# Patient Record
Sex: MALE | URBAN - METROPOLITAN AREA
[De-identification: names, ages, dates, MRNs, and addresses within clinical notes are randomized per-mention and may not be internally consistent; named-entity substitution may affect disease eponyms.]

---

## 2023-04-25 ENCOUNTER — OFFICE VISIT (OUTPATIENT)
Dept: URBAN - METROPOLITAN AREA CLINIC 51 | Facility: CLINIC | Age: 83
End: 2023-04-25
Payer: MEDICARE

## 2023-04-25 DIAGNOSIS — H04.123 DRY EYE SYNDROME OF BILATERAL LACRIMAL GLANDS: ICD-10-CM

## 2023-04-25 DIAGNOSIS — H40.013 OPEN ANGLE WITH BORDERLINE FINDINGS, LOW RISK, BILATERAL: ICD-10-CM

## 2023-04-25 DIAGNOSIS — H43.813 VITREOUS DEGENERATION, BILATERAL: ICD-10-CM

## 2023-04-25 DIAGNOSIS — H25.813 COMBINED FORMS OF AGE-RELATED CATARACT, BILATERAL: Primary | ICD-10-CM

## 2023-04-25 PROCEDURE — 92133 CPTRZD OPH DX IMG PST SGM ON: CPT | Performed by: OPTOMETRIST

## 2023-04-25 PROCEDURE — 99204 OFFICE O/P NEW MOD 45 MIN: CPT | Performed by: OPTOMETRIST

## 2023-04-25 PROCEDURE — 92134 CPTRZ OPH DX IMG PST SGM RTA: CPT | Performed by: OPTOMETRIST

## 2023-04-25 ASSESSMENT — INTRAOCULAR PRESSURE
OD: 13
OS: 15

## 2023-04-25 ASSESSMENT — VISUAL ACUITY
OS: 20/40
OD: 20/40

## 2023-04-25 ASSESSMENT — KERATOMETRY
OS: 42.25
OD: 42.25

## 2023-04-25 NOTE — IMPRESSION/PLAN
Impression: Open angle with borderline findings, low risk, bilateral: H40.013. Plan: c/d ratio IOP normal 
RNFL- wnl OU 
will monitor. 
** HVF prior to surgery **

## 2023-04-25 NOTE — IMPRESSION/PLAN
Impression: Dry eye syndrome of bilateral lacrimal glands: H04.123.  Plan: Recommend use Artificial tears QID OU.
ed the importance of treating prior to surgery

## 2023-04-25 NOTE — IMPRESSION/PLAN
Impression: Combined forms of age-related cataract, bilateral: H25.813.
** HVF prior to surgery **  Plan:  Discussed cataracts with patient. Discussed treatment options. Surgical treatment is recommended. Surgical risks and benefits discussed. Patient elects surgical treatment. Recommend surgery OU, OD first. multifocal, toric, standard, LenSx and ORA. Aim OD: plano. Aim OS: plano. Patient will need glasses for all near work, including computer.

## 2023-06-28 ENCOUNTER — ADULT PHYSICAL (OUTPATIENT)
Dept: URBAN - METROPOLITAN AREA CLINIC 51 | Facility: CLINIC | Age: 83
End: 2023-06-28
Payer: MEDICARE

## 2023-06-28 DIAGNOSIS — Z01.818 ENCOUNTER FOR OTHER PREPROCEDURAL EXAMINATION: Primary | ICD-10-CM

## 2023-06-28 DIAGNOSIS — H25.813 COMBINED FORMS OF AGE-RELATED CATARACT, BILATERAL: ICD-10-CM

## 2023-06-28 PROCEDURE — 99203 OFFICE O/P NEW LOW 30 MIN: CPT | Performed by: PHYSICIAN ASSISTANT

## 2023-07-12 ENCOUNTER — PRE-OPERATIVE VISIT (OUTPATIENT)
Dept: URBAN - METROPOLITAN AREA CLINIC 44 | Facility: CLINIC | Age: 83
End: 2023-07-12
Payer: MEDICARE

## 2023-07-12 DIAGNOSIS — H52.223 REGULAR ASTIGMATISM, BILATERAL: ICD-10-CM

## 2023-07-12 DIAGNOSIS — H25.813 COMBINED FORMS OF AGE-RELATED CATARACT, BILATERAL: Primary | ICD-10-CM

## 2023-07-12 PROCEDURE — 99204 OFFICE O/P NEW MOD 45 MIN: CPT | Performed by: OPHTHALMOLOGY

## 2023-07-18 ENCOUNTER — SURGERY (OUTPATIENT)
Dept: URBAN - METROPOLITAN AREA SURGERY 19 | Facility: SURGERY | Age: 83
End: 2023-07-18
Payer: MEDICARE

## 2023-07-18 DIAGNOSIS — H52.223 REGULAR ASTIGMATISM, BILATERAL: ICD-10-CM

## 2023-07-18 DIAGNOSIS — H25.813 COMBINED FORMS OF AGE-RELATED CATARACT, BILATERAL: Primary | ICD-10-CM

## 2023-07-18 PROCEDURE — 66984 XCAPSL CTRC RMVL W/O ECP: CPT | Performed by: OPHTHALMOLOGY

## 2023-07-19 ENCOUNTER — POST-OPERATIVE VISIT (OUTPATIENT)
Dept: URBAN - METROPOLITAN AREA CLINIC 51 | Facility: CLINIC | Age: 83
End: 2023-07-19
Payer: MEDICARE

## 2023-07-19 DIAGNOSIS — Z48.810 ENCOUNTER FOR SURGICAL AFTERCARE FOLLOWING SURGERY ON A SENSE ORGAN: Primary | ICD-10-CM

## 2023-07-19 PROCEDURE — 99024 POSTOP FOLLOW-UP VISIT: CPT | Performed by: OPTOMETRIST

## 2023-07-19 ASSESSMENT — INTRAOCULAR PRESSURE: OD: 16

## 2023-07-19 NOTE — IMPRESSION/PLAN
Impression: S/P Cataract Extraction by phacoemulsification with IOL placement; LRI (Limbal Relaxing Incision) OD - 1 Day. Encounter for surgical aftercare following surgery on a sense organ  Z48.810. Plan: Doing well POD1. Swelling and inflammation noted on today's examination. Discussed with patient that vision will continue to improve as swelling and inflammation resolves. Restrictions apply for 1 week. Recommend AT's when eyes feel dry/gritty, itchy, or water. Discussed Systane, Refresh and Extra tears patient has are all lubricating drops. 
RTC as scheduled for 1 week post op appointment

## 2023-07-26 ENCOUNTER — POST-OPERATIVE VISIT (OUTPATIENT)
Dept: URBAN - METROPOLITAN AREA CLINIC 51 | Facility: CLINIC | Age: 83
End: 2023-07-26
Payer: MEDICARE

## 2023-07-26 DIAGNOSIS — Z48.810 ENCOUNTER FOR SURGICAL AFTERCARE FOLLOWING SURGERY ON A SENSE ORGAN: Primary | ICD-10-CM

## 2023-07-26 PROCEDURE — 99024 POSTOP FOLLOW-UP VISIT: CPT | Performed by: OPTOMETRIST

## 2023-07-26 ASSESSMENT — INTRAOCULAR PRESSURE
OD: 12
OS: 15

## 2023-07-26 ASSESSMENT — KERATOMETRY
OD: 42.38
OS: 42.13

## 2023-07-26 ASSESSMENT — VISUAL ACUITY
OD: 20/20
OS: 20/25

## 2023-08-02 ENCOUNTER — SURGERY (OUTPATIENT)
Dept: URBAN - METROPOLITAN AREA SURGERY 19 | Facility: SURGERY | Age: 83
End: 2023-08-02
Payer: MEDICARE

## 2023-08-02 DIAGNOSIS — H25.812 COMBINED FORMS OF AGE-RELATED CATARACT, LEFT EYE: Primary | ICD-10-CM

## 2023-08-02 PROCEDURE — 66984 XCAPSL CTRC RMVL W/O ECP: CPT | Performed by: OPHTHALMOLOGY

## 2023-08-02 RX ORDER — BRIMONIDINE TARTRATE 2 MG/ML
0.2 % SOLUTION/ DROPS OPHTHALMIC
Qty: 15 | Refills: 1 | Status: ACTIVE
Start: 2023-08-02

## 2023-08-03 ENCOUNTER — POST-OPERATIVE VISIT (OUTPATIENT)
Dept: URBAN - METROPOLITAN AREA CLINIC 51 | Facility: CLINIC | Age: 83
End: 2023-08-03
Payer: MEDICARE

## 2023-08-03 DIAGNOSIS — Z96.1 PRESENCE OF INTRAOCULAR LENS: Primary | ICD-10-CM

## 2023-08-03 PROCEDURE — 99024 POSTOP FOLLOW-UP VISIT: CPT | Performed by: OPTOMETRIST

## 2023-08-03 ASSESSMENT — INTRAOCULAR PRESSURE: OS: 21

## 2023-09-05 ENCOUNTER — POST-OPERATIVE VISIT (OUTPATIENT)
Dept: URBAN - METROPOLITAN AREA CLINIC 51 | Facility: CLINIC | Age: 83
End: 2023-09-05
Payer: MEDICARE

## 2023-09-05 DIAGNOSIS — Z96.1 PRESENCE OF INTRAOCULAR LENS: ICD-10-CM

## 2023-09-05 DIAGNOSIS — H52.4 PRESBYOPIA: Primary | ICD-10-CM

## 2023-09-05 PROCEDURE — 99024 POSTOP FOLLOW-UP VISIT: CPT | Performed by: OPTOMETRIST

## 2023-09-05 ASSESSMENT — INTRAOCULAR PRESSURE
OS: 20
OS: 12
OD: 13

## 2023-09-05 ASSESSMENT — VISUAL ACUITY
OD: 20/20
OS: 20/20

## 2023-09-05 ASSESSMENT — KERATOMETRY
OD: 42.38
OS: 42.25

## 2024-03-05 ENCOUNTER — OFFICE VISIT (OUTPATIENT)
Dept: URBAN - METROPOLITAN AREA CLINIC 51 | Facility: CLINIC | Age: 84
End: 2024-03-05
Payer: MEDICARE

## 2024-03-05 DIAGNOSIS — H40.013 OPEN ANGLE WITH BORDERLINE FINDINGS, LOW RISK, BILATERAL: ICD-10-CM

## 2024-03-05 DIAGNOSIS — H04.123 DRY EYE SYNDROME OF BILATERAL LACRIMAL GLANDS: ICD-10-CM

## 2024-03-05 DIAGNOSIS — H26.493 OTHER SECONDARY CATARACT, BILATERAL: Primary | ICD-10-CM

## 2024-03-05 DIAGNOSIS — H43.813 VITREOUS DEGENERATION, BILATERAL: ICD-10-CM

## 2024-03-05 PROCEDURE — 99214 OFFICE O/P EST MOD 30 MIN: CPT | Performed by: OPTOMETRIST

## 2024-03-05 PROCEDURE — 92134 CPTRZ OPH DX IMG PST SGM RTA: CPT | Performed by: OPTOMETRIST

## 2024-03-05 ASSESSMENT — KERATOMETRY
OS: 42.13
OD: 42.13

## 2024-03-05 ASSESSMENT — VISUAL ACUITY
OD: 20/20
OS: 20/20

## 2024-03-05 ASSESSMENT — INTRAOCULAR PRESSURE
OS: 14
OD: 13

## 2025-03-06 ENCOUNTER — OFFICE VISIT (OUTPATIENT)
Dept: URBAN - METROPOLITAN AREA CLINIC 51 | Facility: CLINIC | Age: 85
End: 2025-03-06
Payer: MEDICARE

## 2025-03-06 DIAGNOSIS — H40.013 OPEN ANGLE WITH BORDERLINE FINDINGS, LOW RISK, BILATERAL: Primary | ICD-10-CM

## 2025-03-06 DIAGNOSIS — H26.493 OTHER SECONDARY CATARACT, BILATERAL: ICD-10-CM

## 2025-03-06 DIAGNOSIS — H43.813 VITREOUS DEGENERATION, BILATERAL: ICD-10-CM

## 2025-03-06 DIAGNOSIS — H04.123 DRY EYE SYNDROME OF BILATERAL LACRIMAL GLANDS: ICD-10-CM

## 2025-03-06 ASSESSMENT — INTRAOCULAR PRESSURE
OD: 13
OS: 13

## 2025-03-06 ASSESSMENT — VISUAL ACUITY
OS: 20/30
OD: 20/25

## 2025-03-06 ASSESSMENT — KERATOMETRY
OS: 41.63
OD: 42.13